# Patient Record
Sex: FEMALE | Race: WHITE | Employment: OTHER
[De-identification: names, ages, dates, MRNs, and addresses within clinical notes are randomized per-mention and may not be internally consistent; named-entity substitution may affect disease eponyms.]

---

## 2022-03-19 PROBLEM — I63.9 CEREBROVASCULAR ACCIDENT (CVA) DUE TO THROMBOSIS (HCC): Status: ACTIVE | Noted: 2021-02-02

## 2022-03-19 PROBLEM — L89.41: Status: ACTIVE | Noted: 2021-02-02

## 2022-03-20 PROBLEM — G30.1 LATE ONSET ALZHEIMER'S DISEASE WITHOUT BEHAVIORAL DISTURBANCE (HCC): Status: ACTIVE | Noted: 2017-05-30

## 2022-03-20 PROBLEM — F02.80 LATE ONSET ALZHEIMER'S DISEASE WITHOUT BEHAVIORAL DISTURBANCE (HCC): Status: ACTIVE | Noted: 2017-05-30

## 2022-08-09 ENCOUNTER — OFFICE VISIT (OUTPATIENT)
Dept: INTERNAL MEDICINE CLINIC | Facility: CLINIC | Age: 77
End: 2022-08-09
Payer: MEDICARE

## 2022-08-09 VITALS
HEART RATE: 78 BPM | TEMPERATURE: 97.5 F | RESPIRATION RATE: 16 BRPM | SYSTOLIC BLOOD PRESSURE: 127 MMHG | DIASTOLIC BLOOD PRESSURE: 73 MMHG

## 2022-08-09 DIAGNOSIS — L21.0 SEBORRHEA CAPITIS IN ADULT: ICD-10-CM

## 2022-08-09 DIAGNOSIS — I48.21 PERMANENT ATRIAL FIBRILLATION (HCC): ICD-10-CM

## 2022-08-09 DIAGNOSIS — F02.80 LATE ONSET ALZHEIMER'S DISEASE WITHOUT BEHAVIORAL DISTURBANCE (HCC): Primary | ICD-10-CM

## 2022-08-09 DIAGNOSIS — Z51.89 WOUND CHECK, ABSCESS: ICD-10-CM

## 2022-08-09 DIAGNOSIS — G30.1 LATE ONSET ALZHEIMER'S DISEASE WITHOUT BEHAVIORAL DISTURBANCE (HCC): Primary | ICD-10-CM

## 2022-08-09 PROCEDURE — 99214 OFFICE O/P EST MOD 30 MIN: CPT | Performed by: INTERNAL MEDICINE

## 2022-08-09 PROCEDURE — G8400 PT W/DXA NO RESULTS DOC: HCPCS | Performed by: INTERNAL MEDICINE

## 2022-08-09 PROCEDURE — G8427 DOCREV CUR MEDS BY ELIG CLIN: HCPCS | Performed by: INTERNAL MEDICINE

## 2022-08-09 PROCEDURE — G8421 BMI NOT CALCULATED: HCPCS | Performed by: INTERNAL MEDICINE

## 2022-08-09 PROCEDURE — 1123F ACP DISCUSS/DSCN MKR DOCD: CPT | Performed by: INTERNAL MEDICINE

## 2022-08-09 PROCEDURE — 1090F PRES/ABSN URINE INCON ASSESS: CPT | Performed by: INTERNAL MEDICINE

## 2022-08-09 PROCEDURE — 1036F TOBACCO NON-USER: CPT | Performed by: INTERNAL MEDICINE

## 2022-08-09 RX ORDER — CLOBETASOL PROPIONATE 0.46 MG/ML
SOLUTION TOPICAL 2 TIMES DAILY
Qty: 50 ML | Refills: 5 | Status: SHIPPED | OUTPATIENT
Start: 2022-08-09

## 2022-08-09 SDOH — ECONOMIC STABILITY: FOOD INSECURITY: WITHIN THE PAST 12 MONTHS, YOU WORRIED THAT YOUR FOOD WOULD RUN OUT BEFORE YOU GOT MONEY TO BUY MORE.: NEVER TRUE

## 2022-08-09 SDOH — ECONOMIC STABILITY: FOOD INSECURITY: WITHIN THE PAST 12 MONTHS, THE FOOD YOU BOUGHT JUST DIDN'T LAST AND YOU DIDN'T HAVE MONEY TO GET MORE.: NEVER TRUE

## 2022-08-09 ASSESSMENT — PATIENT HEALTH QUESTIONNAIRE - PHQ9: DEPRESSION UNABLE TO ASSESS: FUNCTIONAL CAPACITY MOTIVATION LIMITS ACCURACY

## 2022-08-09 ASSESSMENT — SOCIAL DETERMINANTS OF HEALTH (SDOH): HOW HARD IS IT FOR YOU TO PAY FOR THE VERY BASICS LIKE FOOD, HOUSING, MEDICAL CARE, AND HEATING?: NOT HARD AT ALL

## 2023-02-22 ENCOUNTER — OFFICE VISIT (OUTPATIENT)
Dept: INTERNAL MEDICINE CLINIC | Facility: CLINIC | Age: 78
End: 2023-02-22
Payer: MEDICARE

## 2023-02-22 VITALS
TEMPERATURE: 97.3 F | SYSTOLIC BLOOD PRESSURE: 121 MMHG | HEART RATE: 74 BPM | DIASTOLIC BLOOD PRESSURE: 70 MMHG | RESPIRATION RATE: 16 BRPM

## 2023-02-22 DIAGNOSIS — L21.0 SEBORRHEA CAPITIS IN ADULT: ICD-10-CM

## 2023-02-22 DIAGNOSIS — K59.01 SLOW TRANSIT CONSTIPATION: ICD-10-CM

## 2023-02-22 DIAGNOSIS — G30.1 LATE ONSET ALZHEIMER'S DISEASE WITHOUT BEHAVIORAL DISTURBANCE (HCC): ICD-10-CM

## 2023-02-22 DIAGNOSIS — I48.21 PERMANENT ATRIAL FIBRILLATION (HCC): ICD-10-CM

## 2023-02-22 DIAGNOSIS — F02.80 LATE ONSET ALZHEIMER'S DISEASE WITHOUT BEHAVIORAL DISTURBANCE (HCC): ICD-10-CM

## 2023-02-22 DIAGNOSIS — L40.9 PSORIASIS: ICD-10-CM

## 2023-02-22 DIAGNOSIS — I25.10 CORONARY ARTERY DISEASE INVOLVING NATIVE CORONARY ARTERY OF NATIVE HEART WITHOUT ANGINA PECTORIS: Primary | ICD-10-CM

## 2023-02-22 PROBLEM — L89.41: Status: RESOLVED | Noted: 2021-02-02 | Resolved: 2023-02-22

## 2023-02-22 PROCEDURE — G8421 BMI NOT CALCULATED: HCPCS | Performed by: INTERNAL MEDICINE

## 2023-02-22 PROCEDURE — 99213 OFFICE O/P EST LOW 20 MIN: CPT | Performed by: INTERNAL MEDICINE

## 2023-02-22 PROCEDURE — G8400 PT W/DXA NO RESULTS DOC: HCPCS | Performed by: INTERNAL MEDICINE

## 2023-02-22 PROCEDURE — G8427 DOCREV CUR MEDS BY ELIG CLIN: HCPCS | Performed by: INTERNAL MEDICINE

## 2023-02-22 PROCEDURE — 1123F ACP DISCUSS/DSCN MKR DOCD: CPT | Performed by: INTERNAL MEDICINE

## 2023-02-22 PROCEDURE — G8484 FLU IMMUNIZE NO ADMIN: HCPCS | Performed by: INTERNAL MEDICINE

## 2023-02-22 PROCEDURE — 1090F PRES/ABSN URINE INCON ASSESS: CPT | Performed by: INTERNAL MEDICINE

## 2023-02-22 PROCEDURE — 1036F TOBACCO NON-USER: CPT | Performed by: INTERNAL MEDICINE

## 2023-02-22 RX ORDER — CLOBETASOL PROPIONATE 0.46 MG/ML
SOLUTION TOPICAL 2 TIMES DAILY
Qty: 50 ML | Refills: 5 | Status: SHIPPED | OUTPATIENT
Start: 2023-02-22

## 2023-02-22 SDOH — ECONOMIC STABILITY: FOOD INSECURITY: WITHIN THE PAST 12 MONTHS, THE FOOD YOU BOUGHT JUST DIDN'T LAST AND YOU DIDN'T HAVE MONEY TO GET MORE.: NEVER TRUE

## 2023-02-22 SDOH — ECONOMIC STABILITY: INCOME INSECURITY: HOW HARD IS IT FOR YOU TO PAY FOR THE VERY BASICS LIKE FOOD, HOUSING, MEDICAL CARE, AND HEATING?: NOT HARD AT ALL

## 2023-02-22 SDOH — ECONOMIC STABILITY: HOUSING INSECURITY
IN THE LAST 12 MONTHS, WAS THERE A TIME WHEN YOU DID NOT HAVE A STEADY PLACE TO SLEEP OR SLEPT IN A SHELTER (INCLUDING NOW)?: NO

## 2023-02-22 SDOH — ECONOMIC STABILITY: FOOD INSECURITY: WITHIN THE PAST 12 MONTHS, YOU WORRIED THAT YOUR FOOD WOULD RUN OUT BEFORE YOU GOT MONEY TO BUY MORE.: NEVER TRUE

## 2023-02-22 ASSESSMENT — PATIENT HEALTH QUESTIONNAIRE - PHQ9: DEPRESSION UNABLE TO ASSESS: FUNCTIONAL CAPACITY MOTIVATION LIMITS ACCURACY

## 2023-02-23 NOTE — PROGRESS NOTES
2/23/2023 12:05 PM  Location:[facility] [address] INTERNAL MEDICINE  [place]  Patient #:  [id number]  YOB: 1945          YOUR LAST HEMOGLOBIN A1CS:   No results found for: HBA1C, [id number]    YOUR LAST LIPID PROFILE: No results found for: CHOL, CHOLPOCT, CHOLX, CHLST, CHOLV, HDL, HDLPOC, HDLC, LDL, LDLC, VLDLC, VLDL, TGLX, TRIGL      No results found for: GFRAA, CRCLT, [id number], CCT, JANINE, CREAPOC, CREA, BUN, BUNPOC, IBUN, NAPOC, NA, PNA, WBNA, K, KPOCT, PLK, WBK, CLPOC, PCL, CL, WBCL, CO2, DIG        History of Present Illness     Chief Complaint   Patient presents with    6 Month Follow-Up     Pt presents to the office today for a 6 month follow-up      Skin Problem     Have a place on the left arm that the  would like for the doctor to check    Chest Congestion     A lot of chest congestion; taking an antihistamine to help. Its no color but just a lot of it. Sometimes you can hear it gurgling     Constipation     Having constipation; no otc meds just doing prunes daily     This patient is brought in by her family. She continues to have decline regarding her Alzheimer's dementia. She is still eating and drinking with assistance. No choking has occurred. They monitored her skin and there is no skin breakdown. Occasionally they note some gurgling with her respirations. Constipation is noted at times and they are giving her prunes which she does chew and swallow    Ms. Korey Nichols is a 76 y.o. female  who presents for office visit      Allergies   Allergen Reactions    Pravastatin Other (See Comments)     Mental Problems     Past Medical History:   Diagnosis Date    Anxiety 11/3/2015    Anxiety 11/3/2015    Arrhythmia     atrial fib    Atrial fibrillation ([facility] [place] [address] 11/3/2015    CAD (coronary artery disease)     Carpal tunnel syndrome     Cerebrovascular accident (CVA) due to thrombosis ([facility] [place] [address] 2/2/2021    Costochondritis     Encounter for long-term current use of medication 11/3/2015    Hyperlipidemia 11/3/2015    Insomnia     Long term use of bisphosphonates     Menopausal disorder     Migraine 11/3/2015    Migraine 11/3/2015    Myalgia and myositis 11/3/2015    Myalgia and myositis 11/3/2015    Osteoporosis 11/3/2015    Peripheral vascular disease ([facility] [address]     Post PTCA 11/16/2010    3x12 Taxus to pLAD, ptca to jailed diagonal     Post-menopausal bleeding 11/3/2015    Psoriasis 11/3/2015    PUD (peptic ulcer disease)     Pulmonary nodule     Sciatica 11/3/2015    Sciatica 11/3/2015     Social History     Socioeconomic History    Marital status:      Spouse name: None    Number of children: None    Years of education: None    Highest education level: None   Tobacco Use    Smoking status: Never    Smokeless tobacco: Never   Substance and Sexual Activity    Alcohol use: No     Alcohol/week: 0.0 standard drinks    Drug use: No     Social Determinants of Health     Financial Resource Strain: Low Risk     Difficulty of Paying Living Expenses: Not hard at all   Food Insecurity: No Food Insecurity    Worried About Running Out of Food in the Last Year: Never true    Ran Out of Food in the Last Year: Never true   Transportation Needs: Unknown    Lack of Transportation (Non-Medical): No   Housing Stability: Unknown    Unstable Housing in the Last Year: No     Past Surgical History:   Procedure Laterality Date    BREAST SURGERY      cycst removal    CHOLECYSTECTOMY  2002    ORTHOPEDIC SURGERY Bilateral 2001 & 2002    bunion     Current Outpatient Medications   Medication Sig Dispense Refill    diphenhydrAMINE (BENADRYL CHILDRENS ALLERGY) 12.5 MG/5ML liquid Take by mouth 2 times daily as needed for Allergies      clobetasol (TEMOVATE) 0.05 % external solution Apply topically 2 times daily 50 mL 5     No current facility-administered medications for this visit.      Health Maintenance   Topic Date Due    Depression Screen  Never done    Hepatitis C screen  Never done    DTaP/Tdap/Td vaccine (1 - Tdap) Never done    DEXA (modify frequency per FRAX score)  Never done    Breast cancer screen  Never done    COVID-19 Vaccine (3 - Booster for Joce Eriksson series) 04/08/2021    Annual Wellness Visit (AWV)  Never done    Flu vaccine (1) 08/01/2022    Shingles vaccine  Completed    Pneumococcal 65+ years Vaccine  Completed    Hepatitis A vaccine  Aged Out    Hib vaccine  Aged Out    Meningococcal (ACWY) vaccine  Aged Out     Family History   Problem Relation Age of Onset    Heart Disease Maternal Grandmother     Stroke Father     Heart Attack Mother     Elevated Lipids Mother     Hypertension Mother     Rheum Arthritis Maternal Aunt     Heart Disease Maternal Aunt     Elevated Lipids Sister     Cancer Father         lung    Diabetes Father     Lung Disease Father     Asthma Neg Hx     Bleeding Prob Neg Hx     Headache Neg Hx     Migraines Neg Hx     Psychiatric Disorder Neg Hx     Mental Retardation Neg Hx     Rheum Arthritis Other     Cancer Maternal Grandmother         Stomach Cancer    Heart Disease Mother              Review of Systems  Review of Systems    /70 (Site: Right Upper Arm, Position: Sitting, Cuff Size: Medium Adult)   Pulse 74   Temp 97.3 °F (36.3 °C) (Temporal)   Resp 16       Physical Exam    Physical Exam  Constitutional:       Comments: Wheelchair-bound leaning forward slightly kyphotic in no distress. He is unable to communicate   HENT:      Head: Normocephalic and atraumatic. Eyes:      Extraocular Movements: Extraocular movements intact. Pupils: Pupils are equal, round, and reactive to light. Cardiovascular:      Rate and Rhythm: Normal rate and regular rhythm. Heart sounds: Normal heart sounds. No murmur heard. Pulmonary:      Effort: Pulmonary effort is normal.      Breath sounds: Normal breath sounds. Skin:     General: Skin is warm and dry. Neurological:      Mental Status: She is alert. Assessment & Plan    Encounter Diagnoses   Name Primary?     Coronary artery disease involving native coronary artery of native heart without angina pectoris Yes    Seborrhea capitis in adult     Late onset Alzheimer's disease without behavioral disturbance (HCC)     Permanent atrial fibrillation (HCC)     Psoriasis     Slow transit constipation        Current Outpatient Medications   Medication Sig Dispense Refill    diphenhydrAMINE (BENADRYL CHILDRENS ALLERGY) 12.5 MG/5ML liquid Take by mouth 2 times daily as needed for Allergies      clobetasol (TEMOVATE) 0.05 % external solution Apply topically 2 times daily 50 mL 5     No current facility-administered medications for this visit. No orders of the defined types were placed in this encounter. Medications Discontinued During This Encounter   Medication Reason    clobetasol (TEMOVATE) 0.05 % external solution REORDER       1. Seborrhea capitis in adult     - clobetasol (TEMOVATE) 0.05 % external solution; Apply topically 2 times daily  Dispense: 50 mL; Refill: 5    2. Coronary artery disease involving native coronary artery of native heart without angina pectoris  Stable symptomatically the history is difficult    3. Late onset Alzheimer's disease without behavioral disturbance ([address]  Amazingly the family continues to care for her at home with no complications noted such as decubiti or aspiration    4. Permanent atrial fibrillation (HCC)  Appears to be in sinus rhythm on exam    5. Psoriasis  Mostly in scalp    6. Slow transit constipation  They are told to try to give her small amount of MiraLAX if possible      No follow-up provider specified.         Kolby Herman MD

## 2023-08-22 ENCOUNTER — TELEMEDICINE (OUTPATIENT)
Dept: INTERNAL MEDICINE CLINIC | Facility: CLINIC | Age: 78
End: 2023-08-22
Payer: MEDICARE

## 2023-08-22 DIAGNOSIS — I25.10 CORONARY ARTERY DISEASE INVOLVING NATIVE CORONARY ARTERY OF NATIVE HEART WITHOUT ANGINA PECTORIS: Primary | ICD-10-CM

## 2023-08-22 DIAGNOSIS — L40.9 PSORIASIS: ICD-10-CM

## 2023-08-22 DIAGNOSIS — M81.0 OSTEOPOROSIS, UNSPECIFIED OSTEOPOROSIS TYPE, UNSPECIFIED PATHOLOGICAL FRACTURE PRESENCE: ICD-10-CM

## 2023-08-22 DIAGNOSIS — F02.80 LATE ONSET ALZHEIMER'S DISEASE WITHOUT BEHAVIORAL DISTURBANCE (HCC): ICD-10-CM

## 2023-08-22 DIAGNOSIS — L21.0 SEBORRHEA CAPITIS IN ADULT: ICD-10-CM

## 2023-08-22 DIAGNOSIS — G30.1 LATE ONSET ALZHEIMER'S DISEASE WITHOUT BEHAVIORAL DISTURBANCE (HCC): ICD-10-CM

## 2023-08-22 DIAGNOSIS — K59.01 SLOW TRANSIT CONSTIPATION: ICD-10-CM

## 2023-08-22 PROCEDURE — 99442 PR PHYS/QHP TELEPHONE EVALUATION 11-20 MIN: CPT | Performed by: INTERNAL MEDICINE

## 2023-08-22 RX ORDER — CLOBETASOL PROPIONATE 0.46 MG/ML
SOLUTION TOPICAL 2 TIMES DAILY
Qty: 50 ML | Refills: 11 | Status: SHIPPED | OUTPATIENT
Start: 2023-08-22

## 2023-08-22 NOTE — PROGRESS NOTES
Maya Truong (:  1945) is a Established patient, presenting virtually for evaluation of the following: This telephone virtual visit on this patient she is in her home with her  and I am in the office. Apparently her dementia is stable he is having problems with swelling of her hands he is using a wash rag to prevent nail damage to the palms of her hands. She apparently is eating adequate amount. Constipation continues to be a problem periodically despite use of MiraLAX. Assessment & Plan   Below is the assessment and plan developed based on review of pertinent history, physical exam, labs, studies, and medications. 1. Coronary artery disease involving native coronary artery of native heart without angina pectoris  2. Seborrhea capitis in adult  -     clobetasol (TEMOVATE) 0.05 % external solution; Apply topically 2 times daily, Topical, 2 TIMES DAILY Starting Tue 2023, Disp-50 mL, R-11, Normal  3. Slow transit constipation  4. Late onset Alzheimer's disease without behavioral disturbance ([address])  5. Osteoporosis, unspecified osteoporosis type, unspecified pathological fracture presence  6. Psoriasis    Return in about 6 months (around 2024). Subjective   HPI  Review of Systems       Objective   Patient-Reported Vitals  No data recorded     Physical Exam         On this date 2023 I have spent 15 minutes reviewing previous notes, test results and face to face (virtual) with the patient discussing the diagnosis and importance of compliance with the treatment plan as well as documenting on the day of the visit. Maya Truong, was evaluated through a synchronous (real-time) audio-video encounter. The patient (or guardian if applicable) is aware that this is a billable service, which includes applicable co-pays. This Virtual Visit was conducted with patient's (and/or legal guardian's) consent.  Patient identification was verified, and a caregiver was present when

## 2023-12-11 ENCOUNTER — TELEPHONE (OUTPATIENT)
Dept: INTERNAL MEDICINE CLINIC | Facility: CLINIC | Age: 78
End: 2023-12-11

## 2023-12-11 RX ORDER — AZITHROMYCIN 250 MG/1
250 TABLET, FILM COATED ORAL SEE ADMIN INSTRUCTIONS
Qty: 6 TABLET | Refills: 0 | Status: SHIPPED | OUTPATIENT
Start: 2023-12-11 | End: 2023-12-16

## 2023-12-11 NOTE — TELEPHONE ENCOUNTER
Patient  came into the office, stated that his wife has been having chest congestion,and coughing he is denying fever or any other symptoms,. Informed she may need a virtual appointment and stated he just wants me to send a message and see what Dr. Reynold Luis is willing to do .

## 2024-02-29 ENCOUNTER — TELEMEDICINE (OUTPATIENT)
Dept: INTERNAL MEDICINE CLINIC | Facility: CLINIC | Age: 79
End: 2024-02-29

## 2024-02-29 DIAGNOSIS — I25.10 CORONARY ARTERY DISEASE INVOLVING NATIVE CORONARY ARTERY OF NATIVE HEART WITHOUT ANGINA PECTORIS: Primary | ICD-10-CM

## 2024-02-29 DIAGNOSIS — M81.0 OSTEOPOROSIS, UNSPECIFIED OSTEOPOROSIS TYPE, UNSPECIFIED PATHOLOGICAL FRACTURE PRESENCE: ICD-10-CM

## 2024-02-29 DIAGNOSIS — F02.80 LATE ONSET ALZHEIMER'S DISEASE WITHOUT BEHAVIORAL DISTURBANCE (HCC): ICD-10-CM

## 2024-02-29 DIAGNOSIS — R53.82 CHRONIC FATIGUE: ICD-10-CM

## 2024-02-29 DIAGNOSIS — I48.21 PERMANENT ATRIAL FIBRILLATION (HCC): ICD-10-CM

## 2024-02-29 DIAGNOSIS — G30.1 LATE ONSET ALZHEIMER'S DISEASE WITHOUT BEHAVIORAL DISTURBANCE (HCC): ICD-10-CM

## 2024-02-29 DIAGNOSIS — I63.30 CEREBROVASCULAR ACCIDENT (CVA) DUE TO THROMBOSIS OF CEREBRAL ARTERY (HCC): ICD-10-CM

## 2024-02-29 DIAGNOSIS — K59.01 SLOW TRANSIT CONSTIPATION: ICD-10-CM

## 2024-02-29 ASSESSMENT — PATIENT HEALTH QUESTIONNAIRE - PHQ9: DEPRESSION UNABLE TO ASSESS: FUNCTIONAL CAPACITY MOTIVATION LIMITS ACCURACY

## 2024-03-01 NOTE — PROGRESS NOTES
3/1/2024 5:34 PM  Location:[facility] PHYSICIAN SERVICES  [facility] [place]  [place]  Patient #:  [id number]  YOB: 1945          YOUR LAST HEMOGLOBIN A1CS:   No results found for: \"HBA1C\", \"[id number]\"    YOUR LAST LIPID PROFILE: No results found for: \"CHOL\", \"CHOLPOCT\", \"CHOLX\", \"CHLST\", \"CHOLV\", \"HDL\", \"HDLPOC\", \"HDLC\", \"LDL\", \"LDLC\", \"VLDLC\", \"VLDL\", \"TGLX\", \"TRIGL\"      No results found for: \"GFRAA\", \"CRCLT\", \"[id number]\", \"CCT\", \"JANINE\", \"CREAPOC\", \"CREA\", \"BUN\", \"BUNPOC\", \"IBUN\", \"NAPOC\", \"NA\", \"PNA\", \"WBNA\", \"K\", \"KPOCT\", \"PLK\", \"WBK\", \"CLPOC\", \"PCL\", \"CL\", \"WBCL\", \"CO2\", \"DIG\"        History of Present Illness     Chief Complaint   Patient presents with   • 6 Month Follow-Up     Pt presents to the office today for a 6 month follow-up     • Sinus Problem     Sinus congestion; would like to see if its something they can her. Been giving her benadryl.  No fevers.   • Skin Problem     Skin tears really easy. Is it anything they can do to help that      This is a video virtual visit on this patient.  She continues to be in decline with Alzheimer's dementia and her  and daughter are with her at this time.  She is totally immobile and keeps her head toward the right and has developed some skin tears in the neck area.  They have tried moisturizing creams.  Unfortunately she is not taking in adequate nutrition they are doing the best they can with soft foods.  They have noticed some sinus congestion.  She has expressed no evidence of pain or discomfort.  Ms. Albright is a 78 y.o. female  who presents for video virtual visit patient is at home and I am in the office      Allergies   Allergen Reactions   • Pravastatin Other (See Comments)     Mental Problems     Past Medical History:   Diagnosis Date   • Anxiety 11/3/2015   • Anxiety 11/3/2015   • Arrhythmia     atrial fib   • Atrial fibrillation (HCC) 11/3/2015   • CAD (coronary artery disease)    • Carpal tunnel syndrome    •

## 2024-06-06 NOTE — PROGRESS NOTES
8/9/2022 5:16 PM  Location:[facility] [address] [facility]INE  [place]  Patient #:  [id number]  YOB: 1945          YOUR LAST HEMOGLOBIN A1CS:   No results found for: HBA1C, [id number]    YOUR LAST LIPID PROFILE: No results found for: CHOL, CHOLPOCT, CHOLX, CHLST, CHOLV, HDL, HDLPOC, HDLC, LDL, LDLC, VLDLC, VLDL, TGLX, TRIGL      No results found for: GFRAA, CRCLT, [id number], CCT, JANINE, CREAPOC, CREA, BUN, BUNPOC, IBUN, NAPOC, NA, PNA, WBNA, K, KPOCT, PLK, WBK, CLPOC, PCL, CL, WBCL, CO2, DIG        History of Present Illness     Chief Complaint   Patient presents with    6 Month Follow-Up     Pt presents to the office today for a 6 month follow-up      Skin Problem     Skin getting very thin; pt have a skin tear under the neck     Head Congestion     Per pt's  she has a lot of congestion in the head and chest; been giving her benadryl and was wanting to see if its anything else he should do    Hand Problem     Hands are drawing up    Ear Problem     Would like the doctor to check the ears; have had some drainage in one of the ears     This patient is brought in by her  and caregiver today. She is suffering from progressive Alzheimer's dementia which has been going on for several years. She requires total care. He states that she is able to swallow solid foods better than liquids bowel movements are regular. She has had a recurrent problem with the skin of the right anterior neck breaking down with head movement since she normally keeps her head tilted to the right and moving it to the other side has torn the skin. They have been dressing this with a PolyMem dressing. They have been unable to keep a bandage on this area.     Ms. Darline Maier is a 64 y.o. female  who presents for office visit      Allergies   Allergen Reactions    Pravastatin Other (See Comments)     Mental Problems     Past Medical History:   Diagnosis Date    Anxiety 11/3/2015    Anxiety 11/3/2015    Arrhythmia     atrial fib    Atrial fibrillation ([facility] [address] 11/3/2015    CAD (coronary artery disease)     Carpal tunnel syndrome     Cerebrovascular accident (CVA) due to thrombosis ([facility] 2/2/2021    Costochondritis     Encounter for long-term current use of medication 11/3/2015    Hyperlipidemia 11/3/2015    Insomnia     Long term use of bisphosphonates     Menopausal disorder     Migraine 11/3/2015    Migraine 11/3/2015    Myalgia and myositis 11/3/2015    Myalgia and myositis 11/3/2015    Osteoporosis 11/3/2015    Peripheral vascular disease ([facility]     Post-menopausal bleeding 11/3/2015    Psoriasis 11/3/2015    PUD (peptic ulcer disease)     Pulmonary nodule     Sciatica 11/3/2015    Sciatica 11/3/2015     Social History     Socioeconomic History    Marital status:      Spouse name: None    Number of children: None    Years of education: None    Highest education level: None   Tobacco Use    Smoking status: Never    Smokeless tobacco: Never   Substance and Sexual Activity    Alcohol use: No     Alcohol/week: 0.0 standard drinks    Drug use: No     Social Determinants of Health     Financial Resource Strain: Low Risk     Difficulty of Paying Living Expenses: Not hard at all   Food Insecurity: No Food Insecurity    Worried About Running Out of Food in the Last Year: Never true    Ran Out of Food in the Last Year: Never true     Past Surgical History:   Procedure Laterality Date    BREAST SURGERY      cycst removal    CHOLECYSTECTOMY  2002    ORTHOPEDIC SURGERY Bilateral 2001 & 2002    bunion     Current Outpatient Medications   Medication Sig Dispense Refill    clobetasol (TEMOVATE) 0.05 % external solution Apply topically 2 times daily 50 mL 5     No current facility-administered medications for this visit.      Health Maintenance   Topic Date Due    Annual Wellness Visit (AWV)  Never done    Depression Screen  Never done    Hepatitis C screen  Never done    DTaP/Tdap/Td vaccine (1 - Tdap) Never done    DEXA (modify frequency per FRAX score)  Never done    COVID-19 Vaccine (3 - Booster for Moderna series) 07/11/2021    Flu vaccine (1) 09/01/2022    Shingles vaccine  Completed    Pneumococcal 65+ years Vaccine  Completed    Hepatitis A vaccine  Aged Out    Hepatitis B vaccine  Aged Out    Hib vaccine  Aged Out    Meningococcal (ACWY) vaccine  Aged Out     Family History   Problem Relation Age of Onset    Heart Disease Maternal Grandmother     Stroke Father     Heart Attack Mother     Elevated Lipids Mother     Hypertension Mother     Rheum Arthritis Maternal Aunt     Heart Disease Maternal Aunt     Elevated Lipids Sister     Cancer Father         lung    Diabetes Father     Lung Disease Father     Asthma Neg Hx     Bleeding Prob Neg Hx     Headache Neg Hx     Migraines Neg Hx     Psychiatric Disorder Neg Hx     Mental Retardation Neg Hx     Rheum Arthritis Other     Cancer Maternal Grandmother         Stomach Cancer    Heart Disease Mother              Review of Systems  Review of Systems    /73 (Site: Right Upper Arm, Position: Sitting, Cuff Size: Small Adult)   Pulse 78   Temp 97.5 °F (36.4 °C) (Temporal)   Resp 16       Physical Exam    Physical Exam  Constitutional:       Comments: This patient is sitting in a forward leaning position. Both arms are flexed at the elbow and rigid. Both hands are clasped. She is not responsive to verbal stimuli. HENT:      Head: Normocephalic and atraumatic. Left Ear: There is impacted cerumen. Neck:        Comments: Open wound noted on the right anterior neck no drainage or surrounding erythema is noted. This area was dressed with PolyMem and Kerlix wrap placed with several wraps around her neck which was just tight enough to hold the bandage in place  Cardiovascular:      Rate and Rhythm: Normal rate and regular rhythm. Heart sounds: Normal heart sounds. No murmur heard.   Pulmonary:      Effort: Pulmonary effort is normal.      Breath sounds: Normal breath sounds. Skin:     General: Skin is warm and dry. Assessment & Plan    Encounter Diagnoses   Name Primary? Late onset Alzheimer's disease without behavioral disturbance (HCC) Yes    Seborrhea capitis in adult     Wound check, abscess     Permanent atrial fibrillation (HCC)        Current Outpatient Medications   Medication Sig Dispense Refill    clobetasol (TEMOVATE) 0.05 % external solution Apply topically 2 times daily 50 mL 5     No current facility-administered medications for this visit. Orders Placed This Encounter   Procedures    External Referral to Home Health     Referral Priority:   Routine     Referral Type:   Home Health Care     Referral Reason:   Specialty Services Required     Requested Specialty:   [address]     Number of Visits Requested:   1       Medications Discontinued During This Encounter   Medication Reason    clobetasol (TEMOVATE) 0.05 % external solution REORDER       1. Late onset Alzheimer's disease without behavioral disturbance ([facility] [address]    Progressive, her  and caregiver are doing incredible job caring for her at home. Her only skin breakdown is that in the anterior neck which will need wound care to avoid infection. 2. Seborrhea capitis in adult     - clobetasol (TEMOVATE) 0.05 % external solution; Apply topically 2 times daily  Dispense: 50 mL; Refill: 5    3. Wound check, abscess  Right anterior neck the area was bandaged with a Kerlix wrap around the neck to hold the PolyMem dressing in place. I feel that home health would be of benefit to monitor this wound and to hopefully suggest how we can prevent this in the future  - External Referral to [address] [place] [address] Alli Weber    4. Permanent atrial fibrillation (HCC)  As she appears in sinus rhythm today as noted previously she is unable to swallow any medication    5. Cerumen impaction  Told to use debrox ear kit at home  No follow-up provider specified.         Vero Jacinto MD Inadequate PO intake to meet physiological demand for nutrients

## 2025-08-27 RX ORDER — ERYTHROMYCIN 5 MG/G
OINTMENT OPHTHALMIC EVERY 6 HOURS
Qty: 1 EACH | Refills: 0 | Status: SHIPPED | OUTPATIENT
Start: 2025-08-27

## 2025-09-04 ENCOUNTER — TELEPHONE (OUTPATIENT)
Dept: INTERNAL MEDICINE CLINIC | Facility: CLINIC | Age: 80
End: 2025-09-04

## 2025-09-04 RX ORDER — AMOXICILLIN 250 MG/5ML
500 POWDER, FOR SUSPENSION ORAL 2 TIMES DAILY
Qty: 140 ML | Refills: 0 | Status: SHIPPED | OUTPATIENT
Start: 2025-09-04